# Patient Record
Sex: FEMALE
[De-identification: names, ages, dates, MRNs, and addresses within clinical notes are randomized per-mention and may not be internally consistent; named-entity substitution may affect disease eponyms.]

---

## 2023-02-03 PROBLEM — Z00.00 ENCOUNTER FOR PREVENTIVE HEALTH EXAMINATION: Status: ACTIVE | Noted: 2023-02-03

## 2023-02-08 ENCOUNTER — APPOINTMENT (OUTPATIENT)
Dept: ORTHOPEDIC SURGERY | Facility: CLINIC | Age: 58
End: 2023-02-08
Payer: COMMERCIAL

## 2023-02-08 ENCOUNTER — FORM ENCOUNTER (OUTPATIENT)
Age: 58
End: 2023-02-08

## 2023-02-08 VITALS — WEIGHT: 135 LBS | BODY MASS INDEX: 26.5 KG/M2 | HEIGHT: 60 IN

## 2023-02-08 DIAGNOSIS — M79.673 PAIN IN UNSPECIFIED FOOT: ICD-10-CM

## 2023-02-08 DIAGNOSIS — Z78.9 OTHER SPECIFIED HEALTH STATUS: ICD-10-CM

## 2023-02-08 PROCEDURE — 99203 OFFICE O/P NEW LOW 30 MIN: CPT

## 2023-02-08 PROCEDURE — 73650 X-RAY EXAM OF HEEL: CPT | Mod: RT

## 2023-02-08 NOTE — PHYSICAL EXAM
[Right] : right foot and ankle [Mild] : mild swelling over achilles tendon [NL (40)] : plantar flexion 40 degrees [NL 30)] : inversion 30 degrees [NL (20)] : eversion 20 degrees [5___] : eversion 5[unfilled]/5 [2+] : dorsalis pedis pulse: 2+ [] : patient ambulates without assistive device [TWNoteComboBox7] : dorsiflexion 15 degrees

## 2023-02-08 NOTE — HISTORY OF PRESENT ILLNESS
[6] : 6 [Sharp] : sharp [Shooting] : shooting [de-identified] : 02/08/2023: 5 months posterior heel pain after training for a marathon. saw dpm who gave orthotics. went to 6 weeks of PT w/o sig relief. no specfic injury. no prior sig foot probs. denies dm/tob.  [] : Post Surgical Visit: no [FreeTextEntry1] : RT heel

## 2023-02-08 NOTE — ASSESSMENT
[FreeTextEntry1] : continued pain despite 6 weeks of PT\par mri to eval for achilles tendinopathy\par nsaids prn\par avoid painful activity\par furhter plan pending MRI

## 2023-02-09 ENCOUNTER — APPOINTMENT (OUTPATIENT)
Dept: MRI IMAGING | Facility: CLINIC | Age: 58
End: 2023-02-09
Payer: COMMERCIAL

## 2023-02-09 PROCEDURE — 73721 MRI JNT OF LWR EXTRE W/O DYE: CPT | Mod: RT

## 2023-02-10 ENCOUNTER — TRANSCRIPTION ENCOUNTER (OUTPATIENT)
Age: 58
End: 2023-02-10

## 2023-02-13 ENCOUNTER — APPOINTMENT (OUTPATIENT)
Dept: ORTHOPEDIC SURGERY | Facility: CLINIC | Age: 58
End: 2023-02-13
Payer: COMMERCIAL

## 2023-02-13 VITALS — WEIGHT: 135 LBS | HEIGHT: 60 IN | BODY MASS INDEX: 26.5 KG/M2

## 2023-02-13 DIAGNOSIS — M92.61 JUVENILE OSTEOCHONDROSIS OF TARSUS, RIGHT ANKLE: ICD-10-CM

## 2023-02-13 PROCEDURE — 99214 OFFICE O/P EST MOD 30 MIN: CPT

## 2023-02-13 NOTE — HISTORY OF PRESENT ILLNESS
[6] : 6 [Sharp] : sharp [Shooting] : shooting [de-identified] : 02/08/2023: 5 months posterior heel pain after training for a marathon. saw dpm who gave orthotics. went to 6 weeks of PT w/o sig relief. no specfic injury. no prior sig foot probs. denies dm/tob. \par \par 02/13/2023: MRi f/up. no sig change [] : Post Surgical Visit: no [FreeTextEntry1] : RT heel

## 2023-02-13 NOTE — ASSESSMENT
[FreeTextEntry1] : wbat\par nsaids prn\par discussed surgical and non surgical options\par will do repeat course of PT and reassess\par \par f/up 8 wks\par \par The pros, cons, risks, benefits, and alternatives have been discussed.  The risks include but are not limited to infection, bleeding, injury to small nerves and blood vessels, pain, stiffness, progression, rerupture, dvt, PE, amputation and death. All the patient's questions were answered and they would like to proceed.\par

## 2023-02-13 NOTE — DATA REVIEWED
[MRI] : MRI [Right] : of the right [Ankle] : ankle [I reviewed the films/CD and additionally noted] : I reviewed the films/CD and additionally noted [FreeTextEntry1] : roge deformity w/ achilles tendinopathy

## 2023-02-13 NOTE — PHYSICAL EXAM
[Right] : right foot and ankle [Mild] : mild swelling over achilles tendon [NL (40)] : plantar flexion 40 degrees [NL 30)] : inversion 30 degrees [NL (20)] : eversion 20 degrees [5___] : eversion 5[unfilled]/5 [2+] : dorsalis pedis pulse: 2+ [] : negative anterior drawer at ankle [TWNoteComboBox7] : dorsiflexion 15 degrees

## 2023-02-28 ENCOUNTER — APPOINTMENT (OUTPATIENT)
Dept: ORTHOPEDIC SURGERY | Facility: CLINIC | Age: 58
End: 2023-02-28
Payer: COMMERCIAL

## 2023-02-28 DIAGNOSIS — M22.2X1 PATELLOFEMORAL DISORDERS, RIGHT KNEE: ICD-10-CM

## 2023-02-28 PROCEDURE — 99214 OFFICE O/P EST MOD 30 MIN: CPT

## 2023-02-28 NOTE — HISTORY OF PRESENT ILLNESS
[6] : 6 [Sharp] : sharp [Shooting] : shooting [de-identified] : 02/08/2023: 5 months posterior heel pain after training for a marathon. saw dpm who gave orthotics. went to 6 weeks of PT w/o sig relief. no specfic injury. no prior sig foot probs. denies dm/tob. \par \par 02/13/2023: MRi f/up. no sig change\par \par 02/28/2023;  pt report no improvement in heel.  Would like to rediscuss surgical options in presence of . also c/o knee pain. has h/o prior tendonitis in knee [] : Post Surgical Visit: no [FreeTextEntry1] : RT heel

## 2023-02-28 NOTE — ASSESSMENT
[FreeTextEntry1] : wbat\par nsaids prn\par discussed surgical and non surgical options\par will do repeat course of PT and reassess\par \par PT for knee\par \par f/up 8 wks\par \par The pros, cons, risks, benefits, and alternatives have been discussed.  The risks include but are not limited to infection, bleeding, injury to small nerves and blood vessels, pain, stiffness, progression, rerupture, dvt, PE, amputation and death. All the patient's questions were answered and they would like to proceed.\par

## 2023-02-28 NOTE — PHYSICAL EXAM
[Mild] : mild swelling over achilles tendon [NL (40)] : plantar flexion 40 degrees [NL 30)] : inversion 30 degrees [NL (20)] : eversion 20 degrees [2+] : dorsalis pedis pulse: 2+ [Right] : right knee [5___] : hamstring 5[unfilled]/5 [] : negative anterior drawer at ankle [TWNoteComboBox7] : dorsiflexion 15 degrees

## 2023-04-17 ENCOUNTER — APPOINTMENT (OUTPATIENT)
Dept: ORTHOPEDIC SURGERY | Facility: CLINIC | Age: 58
End: 2023-04-17
Payer: COMMERCIAL

## 2023-04-17 DIAGNOSIS — M76.61 ACHILLES TENDINITIS, RIGHT LEG: ICD-10-CM

## 2023-04-17 PROCEDURE — 99213 OFFICE O/P EST LOW 20 MIN: CPT

## 2023-04-17 NOTE — ASSESSMENT
[FreeTextEntry1] : wbat\par nsaids prn\par discussed surgical and non surgical options\par running out of PT visits -- will do HEP for now\par would like to hold off on surgery for now\par f/up 3 months

## 2023-04-17 NOTE — HISTORY OF PRESENT ILLNESS
[6] : 6 [Sharp] : sharp [Shooting] : shooting [de-identified] : 02/08/2023: 5 months posterior heel pain after training for a marathon. saw dpm who gave orthotics. went to 6 weeks of PT w/o sig relief. no specfic injury. no prior sig foot probs. denies dm/tob. \par \par 02/13/2023: MRi f/up. no sig change\par \par 02/28/2023;  no sig change. has been going to PT [] : Post Surgical Visit: no [FreeTextEntry1] : RT heel  [de-identified] : PT

## 2025-05-22 ENCOUNTER — APPOINTMENT (OUTPATIENT)
Dept: ORTHOPEDIC SURGERY | Facility: CLINIC | Age: 60
End: 2025-05-22